# Patient Record
Sex: MALE | Race: WHITE
[De-identification: names, ages, dates, MRNs, and addresses within clinical notes are randomized per-mention and may not be internally consistent; named-entity substitution may affect disease eponyms.]

---

## 2017-02-13 ENCOUNTER — HOSPITAL ENCOUNTER (EMERGENCY)
Age: 27
LOS: 1 days | Discharge: HOME | End: 2017-02-14
Payer: MEDICAID

## 2017-02-13 DIAGNOSIS — F11.23: Primary | ICD-10-CM

## 2017-02-13 PROCEDURE — 99284 EMERGENCY DEPT VISIT MOD MDM: CPT

## 2017-02-13 PROCEDURE — 99283 EMERGENCY DEPT VISIT LOW MDM: CPT

## 2018-09-11 ENCOUNTER — HOSPITAL ENCOUNTER (EMERGENCY)
Dept: HOSPITAL 76 - ED | Age: 28
Discharge: LEFT BEFORE DISCHARGE | End: 2018-09-11
Payer: MEDICAID

## 2018-09-11 VITALS — SYSTOLIC BLOOD PRESSURE: 155 MMHG | DIASTOLIC BLOOD PRESSURE: 96 MMHG

## 2018-09-11 DIAGNOSIS — Z53.21: Primary | ICD-10-CM

## 2020-12-31 ENCOUNTER — HOSPITAL ENCOUNTER (EMERGENCY)
Dept: HOSPITAL 76 - ED | Age: 30
LOS: 1 days | Discharge: HOME | End: 2021-01-01
Payer: MEDICAID

## 2020-12-31 DIAGNOSIS — S61.211A: Primary | ICD-10-CM
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
## 2020-12-31 PROCEDURE — 99283 EMERGENCY DEPT VISIT LOW MDM: CPT

## 2020-12-31 PROCEDURE — 99282 EMERGENCY DEPT VISIT SF MDM: CPT

## 2020-12-31 NOTE — ED PHYSICIAN DOCUMENTATION
PD HPI UPPER EXT INJURY





- Stated complaint


Stated Complaint: LT FINGER LAC





- Chief complaint


Chief Complaint: Laceration





- History obtained from


History obtained from: Patient





- History of Present Illness


Location: Left, Finger


Type of injury: Laceration


Where injury occurred: Home


Timing - onset: Yesterday


Timing - details: Abrupt onset


Recently seen: Not recently seen





- Additonal information


Additional information: 





sustained left 2nd finger laceration yesterday when using a . He is 

right hand dominant. He presents tonight at urging of his girlfriend. UTD on 

tetanus. He denies numbness, weakness





Review of Systems


Skin: reports: Laceration (s)


Neurologic: denies: Focal weakness, Numbness





PD PAST MEDICAL HISTORY





- Past Medical History


Past Medical History: No





- Past Surgical History


Past Surgical History: No





- Present Medications


Home Medications: 


                                Ambulatory Orders











 Medication  Instructions  Recorded  Confirmed


 


Cephalexin [Keflex] 500 mg PO Q6H #19 capsule 12/31/20 














- Allergies


Allergies/Adverse Reactions: 


                                    Allergies











Allergy/AdvReac Type Severity Reaction Status Date / Time


 


No Known Drug Allergies Allergy   Verified 12/31/20 23:35














- Social History


Does the pt smoke?: No


Smoking Status: Never smoker


Does the pt drink ETOH?: No





PD ED PE NORMAL





- Vitals


Vital signs reviewed: Yes





- General


General: Alert and oriented X 3, No acute distress, Well developed/nourished





- Neuro


Neuro: No motor deficit (full ROM and strength 2nd left finger, flexion and 

extension including with isolation of PIP, DIP, MCP joints (flexion)), No 

sensory deficit (LTS intact at tip of left 2nd digit)





PD ED PE EXPANDED





- Extremities


NETO UE/Hands Visual: 


                            __________________________














                            __________________________





 1 - laceration (1.5 cm, 1-2mm separation of wound edges)








Results





- Vitals


Vitals: 


                               Vital Signs - 24 hr











  12/31/20 12/31/20 01/01/21





  23:33 23:35 00:09


 


Temperature 36.8 C 36.8 C 36.8 C


 


Heart Rate 100 99 89


 


Respiratory 16 16 18





Rate   


 


Blood Pressure 167/100 H 165/99 H 156/82 H


 


O2 Saturation 100 100 100








                                     Oxygen











O2 Source                      Room air

















PD MEDICAL DECISION MAKING





- ED course


Complexity details: considered differential, d/w patient


ED course: 





patient presents with left finger laceration sustained over 24 hours PTA. 

neurovascularly intact. The wound is minimally open (wound edges are 1-2mm apart

 in central third of the wound and only with traction on edges (edges 

approximate at rest)). Steri-strips placed and splint applied rather than 

suturing, and keflex given with rx for 5 days for wound infection prophylaxis 

given length of time since injury sustained





Departure





- Departure


Disposition: 01 Home, Self Care


Clinical Impression: 


 Laceration





Condition: Good


Instructions:  ED Laceration Ext Sutr Stap Tape


Prescriptions: 


Cephalexin [Keflex] 500 mg PO Q6H #19 capsule


Discharge Date/Time: 01/01/21 00:17

## 2021-01-01 VITALS — DIASTOLIC BLOOD PRESSURE: 82 MMHG | SYSTOLIC BLOOD PRESSURE: 156 MMHG
